# Patient Record
Sex: FEMALE | Race: OTHER | HISPANIC OR LATINO | Employment: PART TIME | URBAN - METROPOLITAN AREA
[De-identification: names, ages, dates, MRNs, and addresses within clinical notes are randomized per-mention and may not be internally consistent; named-entity substitution may affect disease eponyms.]

---

## 2018-11-12 ENCOUNTER — HISTORICAL (OUTPATIENT)
Dept: SURGERY | Facility: CLINIC | Age: 28
End: 2018-11-12

## 2018-11-13 ENCOUNTER — HISTORICAL (OUTPATIENT)
Dept: SURGERY | Facility: HOSPITAL | Age: 28
End: 2018-11-13

## 2020-10-20 ENCOUNTER — HISTORICAL (OUTPATIENT)
Dept: ADMINISTRATIVE | Facility: HOSPITAL | Age: 30
End: 2020-10-20

## 2020-10-23 ENCOUNTER — HISTORICAL (OUTPATIENT)
Dept: RADIOLOGY | Facility: HOSPITAL | Age: 30
End: 2020-10-23

## 2022-04-09 ENCOUNTER — HISTORICAL (OUTPATIENT)
Dept: ADMINISTRATIVE | Facility: HOSPITAL | Age: 32
End: 2022-04-09

## 2022-04-26 VITALS
BODY MASS INDEX: 27.28 KG/M2 | DIASTOLIC BLOOD PRESSURE: 78 MMHG | DIASTOLIC BLOOD PRESSURE: 83 MMHG | OXYGEN SATURATION: 99 % | BODY MASS INDEX: 26.45 KG/M2 | HEIGHT: 64 IN | WEIGHT: 149.25 LBS | SYSTOLIC BLOOD PRESSURE: 122 MMHG | HEIGHT: 63 IN | WEIGHT: 159.81 LBS | SYSTOLIC BLOOD PRESSURE: 112 MMHG

## 2022-04-30 NOTE — PROGRESS NOTES
Patient:   Jessie Cedeno             MRN: [id number]            FIN: [id number]               Age:   28 years     Sex:  Female     :  1990   Associated Diagnoses:   None   Author:   Amanda Zamora MD      Pre-Op Dx:  28 F with severe cervical dysplasia        Plan:  CKC    Reviewed resident's H&P.  Agree with plan.  Proceed with case

## 2022-05-02 NOTE — HISTORICAL [facility] CERNER
This is a historical note converted from [employer]. Formatting and pictures may have been removed.  Please reference [employer] for original formatting and attached multimedia. ?  Chief Complaint  H/O TORIE III  ?   History of Present Illness  27yo  referred to our care for a history of abnormal paps. Patient recently postpartum (3months). Had a pap at the start of pregnancy showing HGSIL which was her first ever abnormal pap. Patient had subsequent biopsies done at [facility] that resulted as TORIE 3 and ECC with HGSIL. Patient is otherwise healthy and denies any other medical history.?Patient denies smoking history and is doubtful that she ever received HPV vaccine.  ?   Cervical dysplasia history:  2017 Pap ASC-H  2018 Colpo with bx at 1, 5, 11:00- TORIE 3  2018 Colpo with bx at 2, 4,7, 10, 12:00- TORIE 3 with extension to the endocervical glands. ECC: HGSIL  ?   Patient taking Microgestin for contraception, pleased with regularity of menses.  ?   PMH: h/o PCOS  PSH: Denies  Ob: 2 FT , no complications throughout pregnancies or deliveries  GYN: ~LMP 2018. 14/R/4-5d. Denies h/o STDs. First abnormal pap in 2017. Sexually active with 1 male partner  SH: No T/E/D  FH: Denies  NKDA  Meds: Microgestin  ?  Review of Systems  Constitutional:?no fever, no weakness, no weight loss, no?weight gain  Eyes: no visual changes, no headaches,  Ears, Nose,Mouth, Throat: no runny nose, no sore throat, no cough  Respiratory:?no wheezing,?no shortness of breath  Cardiovascular:?no chest pain, no palpitations  Gastrointestinal:?no nausea, no vomiting,?no diarrhea, no?abdominal pain  Genitourinary: no hematuria, no dysuria,?no heavy vaginal bleeding, no pelvic pain and/or pressure, no?vaginal irritation, no?abnormal discharge  Musculoskeletal: no back pain, no joint pain, no extremity weakness  Endocrine: no polyuria, no polydipsia, no heat/cold intolerance  Hematologic/Lymphatic: no anemia, no  petechiae  Integumentary/Breast:?no skin rash or abnormal lesion  ?  Physical Exam  ???Vitals & Measurements  ??T:?36.8? ?C (Oral)? HR:?76(Apical)? RR:?18? BP:?129/83?  ??HT:?160.02?cm? HT:?160.02?cm? WT:?66.9?kg? WT:?66.9?kg? BMI:?26.13?  ?  General: NAD, A/Ox3  Neck: supple, no thyromegaly palpated, no lymphadenopathy palpated  Respiratory: CTAB, no wheezes, rales, or rhonchi  Cardiovascular: RRR no murmurs, rubs, or gallops  Abdomen: soft, obese, nondistended, nontender to palpation, no hepatosplenomegaly, no masses palpated, normoactive bowel sounds  Extremities: no edema, no calf tenderness  External genitalia: Normal female anatomy, no masses/lesions. Normal appearing urethral meatus. Normal appearing external anus.  Sterile speculum exam: vaginal mucosa normal in appearance. Pink. No masses/lesions. Cervix well visualized, smooth in contour no masses or lesions. Large parous cervix, white plaques noted circumferentially from 12:00-10:00 of transformation zone. Cervix not friable. Blood/Mucous discharge coming from the os.  Bimanual exam: Vagina with moderate capacity. Uterus 10cm in size, no cervical motion tenderness.?No descent, mobile, not broad. No adnexal fullness/tenderness. Normal urethra. Normal bladder.  ?  Assessment/Plan  29yo  with?severe cervical dysplasia here for preoperative assessment for Cold Knife Conization.  ?  --Per cervical examination with staff Dr Tripp, it was noted that her circumferential dysplasia wouldneed to be excised in the operating room with adequate visualization and hemostasis.  --Counseling performed. Surgical consents signed. Preop testing ordered. Surgery case requested. Instruction reviewed, including NPO after midnight. Patient given date and time for PAT appointment today.  --Surgical plan: to OR on 18 for Cold knife conization  --Post-op appointment: 18.  ?  ?  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 Highest education level: High school. Operates hazardous equipment: No., 11/07/2018  ??Exercise  ???Exercise duration: 0., 11/07/2018  ??Home/Environment  ???Lives with Children, Spouse. Alcohol abuse in household: No. Substance abuse in household: No. Smoker in household: No. Injuries/Abuse/Neglect in household: No. Feels unsafe at home: No. Safe place to go: Yes. Family/Friends available for support: Yes., 11/07/2018  ??Nutrition/Health  ???Regular, Wants to lose weight: No. Sleeping concerns: Yes. Feels highly stressed: No., 11/07/2018  ??Sexual  ???Sexually active: Yes. Sexually active at age 18 Years. Number of current partners 1. Number of lifetime partners 1. Sexual orientation: Heterosexual. Uses condoms: No. History of sexual abuse: No., 11/07/2018  ??Substance Abuse  ???Never, 11/07/2018  ??Tobacco  ???Never smoker, N/A, 11/07/2018  Health Maintenance  Health Maintenance  ???Pending?(in the next year)  ??? ??Due?  ??? ? ? ?ADL Screening due??11/08/18??and every 1??year(s)  ??? ? ? ?Alcohol Misuse Screening due??11/08/18??and every 1??year(s)  ??? ? ? ?Tetanus Vaccine due??11/08/18??and every 10??year(s)  ??? ??Due In Future?  ??? ? ? ?Obesity Screening not due until??11/07/19??and every 1??year(s)  ???Satisfied?(in the past 1 year)  ??? ??Satisfied?  ??? ? ? ?Blood Pressure Screening on??11/07/18.??Satisfied by Alka LPN, Mandy P. P.  ??? ? ? ?Body Mass Index Check on??11/07/18.??Satisfied by Mandy Rucker LPN P. P.  ??? ? ? ?Depression Screening on??11/07/18.??Satisfied by Alka CANTU, Mandy P. P.  ??? ? ? ?Obesity Screening on??11/07/18.??Satisfied by Alka CANTU, Mandy P. P.    Counseling:  ?   We  discussed the risks of surgery include but are not limited to visceral or vascular injury, including injury to bowel and bladder, postoperative pain, bleeding including hemorrhage requiring transfusion, and infection. She agrees to blood transfusion in case of health or life threatening blood loss. Patient expressed understanding and is amenable to proceed with surgery.  ?   Patient also seen and counseled by Dr Morrison and Dr Zamora  ?   Reviewed patients medical history, Residents findings on physical exam, and the diagnosis with treatment plan. I was present for the entire colposcopy procedure. I agree with care provided and was reasonable and necessary

## 2022-05-02 NOTE — HISTORICAL [facility] CERNER
This is a historical note converted from Moi. Formatting and pictures may have been removed.  Please reference Moi for original formatting and attached multimedia. [facility] GYN Interval Update H&P  Patient seen and evaluated by myself and Dr Zamora preoperatively on the AM of surgery. There have been no changes since being seen in the office by myself on yesterday 2018. Patient able to state in her own words the procedure that she will be having done on today. She is here with her  Edgar who we will update following completion of the procedure. [phone].  ?  ?  ?   History of Present Illness  29yo  referred to our care for a history of abnormal paps. Patient recently postpartum (3months). Had a pap at the start of pregnancy showing HGSIL which was her first ever abnormal pap. Patient had subsequent biopsies done at [facility] that resulted as TORIE 3 and ECC with HGSIL. Patient is otherwise healthy and denies any other medical history.?Patient denies smoking history and is doubtful that she ever received HPV vaccine.  ?   Cervical dysplasia history:  2017 Pap ASC-H  2018 Colpo with bx at 1, 5, 11:00- TORIE 3  2018 Colpo with bx at 2, 4,7, 10, 12:00- TORIE 3 with extension to the endocervical glands. ECC: HGSIL  ?  Patient taking Microgestin for contraception, pleased with regularity of menses.  ?  PMH: h/o PCOS  PSH: Denies  Ob: 2 FT , no complications throughout pregnancies or deliveries  GYN: ~LMP 2018. 14/R/4-5d. Denies h/o STDs. First abnormal pap in . Sexually active with 1 male partner  SH: No T/E/D  FH: Denies  NKDA  Meds: Microgestin  ?  Review of Systems  Constitutional:?no fever, no weakness, no weight loss, no?weight gain  Eyes: no visual changes, no headaches,  Ears, Nose,Mouth, Throat: no runny nose, no sore throat, no cough  Respiratory:?no wheezing,?no shortness of breath  Cardiovascular:?no chest pain, no palpitations  Gastrointestinal:?no  nausea, no vomiting,?no diarrhea, no?abdominal pain  Genitourinary: no hematuria, no dysuria,?no heavy vaginal bleeding, no pelvic pain and/or pressure, no?vaginal irritation, no?abnormal discharge  Musculoskeletal: no back pain, no joint pain, no extremity weakness  Endocrine: no polyuria, no polydipsia, no heat/cold intolerance  Hematologic/Lymphatic: no anemia, no petechiae  Integumentary/Breast:?no skin rash or abnormal lesion  ?  Physical Exam  ????Vitals & Measurements  ???T:?36.9? ?C (Oral)? HR:?85 RR:?20? BP:?125/75?  ?  General: NAD, A/Ox3  Neck: supple, no thyromegaly palpated, no lymphadenopathy palpated  Respiratory: CTAB, no wheezes, rales, or rhonchi  Cardiovascular: RRR no murmurs, rubs, or gallops  Abdomen: soft, obese, nondistended, nontender to palpation, no hepatosplenomegaly, no masses palpated, normoactive bowel sounds  Extremities: no edema, no calf tenderness  ?  Pelvic Exam done by Dr Guillermo on 2018  External genitalia: Normal female anatomy, no masses/lesions. Normal appearing urethral meatus. Normal appearing external anus.  Sterile speculum exam: vaginal mucosa normal in appearance. Pink. No masses/lesions. Cervix well visualized, smooth in contour no masses or lesions. Large parous cervix, white plaques noted circumferentially from 12:00-10:00 of transformation zone. Cervix not friable. Blood/Mucous discharge coming from the os.  Bimanual exam: Vagina with moderate capacity. Uterus 10cm in size, no cervical motion tenderness.?No descent, mobile, not broad. No adnexal fullness/tenderness. Normal urethra. Normal bladder.  ?  Assessment/Plan  27yo  with?severe cervical dysplasia here for Cold Knife Conization.  ?  --Counseling performed again and patient expressed understanding.  --Surgical plan: To OR for Cold knife conization  --Post-op appointment: 18.  ?  ?  Patient seen and evaluate by Dr Zamora prior to proceeding to the OR. 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 Highest education level: High school. Operates hazardous equipment: No., 11/07/2018  ???Exercise  ????Exercise duration: 0., 11/07/2018  ???Home/Environment  ????Lives with Children, Spouse. Alcohol abuse in household: No. Substance abuse in household: No. Smoker in household: No. Injuries/Abuse/Neglect in household: No. Feels unsafe at home: No. Safe place to go: Yes. Family/Friends available for support: Yes., 11/07/2018  ???Nutrition/Health  ????Regular, Wants to lose weight: No. Sleeping concerns: Yes. Feels highly stressed: No., 11/07/2018  ???Sexual  ????Sexually active: Yes. Sexually active at age 18 Years. Number of current partners 1. Number of lifetime partners 1. Sexual orientation: Heterosexual. Uses condoms: No. History of sexual abuse: No., 11/07/2018  ???Substance Abuse  ????Never, 11/07/2018  ???Tobacco  ????Never smoker, N/A, 11/07/2018  Health Maintenance  Health Maintenance  ???Pending?(in the next year)  ??? ??Due?  ??? ? ? ?ADL Screening due??11/08/18??and every 1??year(s)  ??? ? ? ?Alcohol Misuse Screening due??11/08/18??and every 1??year(s)  ??? ? ? ?Tetanus Vaccine due??11/08/18??and every 10??year(s)  ??? ??Due In Future?  ??? ? ? ?Obesity Screening not due until??11/07/19??and every 1??year(s)  ???Satisfied?(in the past 1 year)  ??? ??Satisfied?  ??? ? ? ?Blood Pressure Screening on??11/07/18.??Satisfied by Alka CANTU, Mandy P. P.  ??? ? ? ?Body Mass Index Check on??11/07/18.??Satisfied by Alka CANTU, Mandy P. P.  ??? ? ? ?Depression Screening on??11/07/18.??Satisfied by Alka CANTU, Mandy P. P.  ??? ? ? ?Obesity Screening on??11/07/18.??Satisfied by  Alka CANTU, Mandy DELACRUZ   ?  ?

## 2022-05-02 NOTE — HISTORICAL [facility] CERNER
This is a historical note converted from [employer]ner. Formatting and pictures may have been removed.  Please reference [employer]ner for original formatting and attached multimedia. Indication for Surgery  27yo  with?severe cervical dysplasia  Preoperative Diagnosis  Severe Cervical Dysplasia  Postoperative Diagnosis  Severe Cervical Dysplasia  Operation  Cold Knife Conization  Surgeon(s)  EMILIE Zamora MD- MD Caroline Gutierrez MD- JANES  Anesthesia  General  Estimated Blood Loss  50cc  Urine Output  100 cc  Findings  EUA revealed a normal 8 week sized uterus with normal contour, mobile not broad. Adequate vaginal capacity in the event a vaginal hysterectomy may be needed. Cervix with severe dysplasia noted from 12 oclock to 2 oclock, 5 oclock, and from 7 oclock to 10 oclock after application of acetic acid.  Specimen(s)  Anterior and Posterior Cervix  Complications  None  Technique  The patient was taken to the operating room and placed in the supine position on the operating table, where general anesthesia was administered. A time out was then performed confirming the procedure and the patient. She was then placed in the dorsal lithotomy position where examination under anesthesia was performed. The patient was prepped and draped in the usual manner for surgery. A weighted speculum was inserted into the vagina. The cervix was exposed with a Woodruff retractor, and the anterior lip of the cervix was grasped with a single-toothed tenaculum.? Acetic acid was applied to the cervix and transformation zone visualized with the above lesions noted. Angle sutures of 0 Vicryl were placed at the 3 o?clock and 9 o?clock positions of the cervix in a figure-of-eight fashion. The cervix was infiltrated with diluted Pitressin solution?20 units?in 50?mL of sterile saline. Approximately 10 mL of the solution was utilized. Cone biopsy was then excised with an 11 scalpel blade. After excision of the biopsy specimen, the biopsy  bed was treated with cautery to assure?hemostasis. An endometrial curettage was undertaken with a medium size sharp curettage. Following curettage, the cervix was again inspected for hemostasis and treated again with cautery. Surgicel was placed into?cervical bed for?additional hemostasis and stay sutures were tied together.?The procedure was then completed. All instruments were removed. The patient was returned to the supine position. The patient was awakened from anesthesia without any difficulty and transferred to the recovery room in good condition. The patient tolerated the procedure well.  ?  ?   Dr Zamora was present for the entire procedure.  ?  ?   I was present with the resident during all critical and key portions of the procedure and agree with the findings documented in the residents note.   An endocervical curettage was performed and not endometrial; therefore the additional specimen submitted to pathology was endocervical.

## 2022-11-16 ENCOUNTER — OFFICE VISIT (OUTPATIENT)
Dept: GYNECOLOGY | Facility: CLINIC | Age: 32
End: 2022-11-16

## 2022-11-16 VITALS
WEIGHT: 168.81 LBS | HEIGHT: 63 IN | BODY MASS INDEX: 29.91 KG/M2 | OXYGEN SATURATION: 99 % | DIASTOLIC BLOOD PRESSURE: 81 MMHG | TEMPERATURE: 98 F | SYSTOLIC BLOOD PRESSURE: 125 MMHG | HEART RATE: 76 BPM | RESPIRATION RATE: 18 BRPM

## 2022-11-16 DIAGNOSIS — N87.9 CERVICAL DYSPLASIA: Primary | ICD-10-CM

## 2022-11-16 PROCEDURE — 87624 HPV HI-RISK TYP POOLED RSLT: CPT

## 2022-11-16 PROCEDURE — 99214 OFFICE O/P EST MOD 30 MIN: CPT | Mod: PBBFAC

## 2022-11-16 RX ORDER — DESOGESTREL AND ETHINYL ESTRADIOL 0.15-0.03
1 KIT ORAL DAILY
Qty: 30 TABLET | Refills: 11 | Status: SHIPPED | OUTPATIENT
Start: 2022-11-16 | End: 2023-11-21 | Stop reason: SDUPTHER

## 2022-11-16 RX ORDER — DESOGESTREL AND ETHINYL ESTRADIOL 0.15-0.03
1 KIT ORAL DAILY
COMMUNITY
Start: 2022-11-14 | End: 2022-11-16 | Stop reason: SDUPTHER

## 2022-11-16 NOTE — PROGRESS NOTES
"  [facility] GYNECOLOGY CLINIC NOTE     Jessie Cedeno is a 32 y.o.  with history of CKC for CIN3 presenting to GYN clinic for repeat pap. Pt's last pap was 2019 which was NILM but insufficient ECC and was lost to follow up given COVID. Pt reports no gyn issues. Having regular periods controlled with OCP's.    Cervical dysplasia history:  2017 Pap ASC-H  2018 Colpo with bx at 1, 5, 11:00- TORIE 3  2018 Colpo with bx at 2, 4,7, 10, 12:00- TORIE 3 with extension to the endocervical glands. ECC: HGSIL  2018, CKC: Benign cone; ECC cannot rule out high-grade lesion  2019, Pap/ECC: NILM, ECC: Scant strips of endocervical and squamous epithelium. Insufficient tissue for further diagnostic evaluation    PMH: h/o PCOS  PSH: CKC ()  OB Hx:    2 FT , no complications throughout pregnancies or deliveries  Gyn Hx:  LMP 2022   14/R/4-5d  Contraception: Enskyce  Sexually active with 1 male partner  Denies h/o STD's  Meds: Enskyce  SHx: No T/E/D  FHx: Denies  Allergies: NKDA    Review of Systems  Pertinent items are noted in HPI.     Objective:     /81 (BP Location: Right arm, Patient Position: Sitting, BP Method: Medium (Automatic))   Pulse 76   Temp 97.8 °F (36.6 °C) (Oral)   Resp 18   Ht 5' 3" (1.6 m)   Wt 76.6 kg (168 lb 12.8 oz)   LMP 2022 (Exact Date)   SpO2 99%   BMI 29.90 kg/m²   Physical Exam:  Gen: Well-nourished, well-developed female appearing stated age. Alert, cooperative, in no acute distress.  CV: rrr, no murmurs/rubs/gallops  Chest: ctabl, no wheezes/rales/rhonchi  Abdomen: Soft, non-tender, no masses.  Extrem: Extremities normal, atraumatic, non-tender calves.  External genitalia: Normal female genetalia without lesion, discharge or tenderness. I  Speculum Exam: Vaginal vault without discharge, nonodorous, no lesions/masses seen.  Cervical os visualized as parous, no lesions/masses.   Bimanual Exam: No cervical motion tenderness.  Uterus freely mobile.  " Normal size uterus. No adnexal masses.  Nontender exam.   Note: RN chaperone present for entirety of exam.      Assessment:       32 y.o.  here for repeat cotesting given h/o CIN3 s/p CKC with ECC cannot rule out HSIL.  1. Cervical dysplasia  Liquid-Based Pap Smear, Screening Screening    Ambulatory referral/consult to Family Practice           Plan:     Problem List Items Addressed This Visit          Renal/    Cervical dysplasia - Primary    Relevant Orders    Liquid-Based Pap Smear, Screening Screening    Ambulatory referral/consult to Family Practice       Return to clinic in 1 year for repeat co-testing or sooner prn    Discussed patient and plan with Dr. Zamora.    Dakotah Coombs MD  [employer] [facility]-PGY2  2022 3:12 PM

## 2022-11-22 LAB
INSULIN SERPL-ACNC: NORMAL U[IU]/ML
LAB AP BETHESDA CATEGORY: NORMAL
LAB AP GYN MOLECULAR TESTING: NORMAL
LAB AP LMP DATE: NORMAL
LAB AP OCHS PAP SPECIMEN ADEQUACY: NORMAL
LAB AP OHS PAP INTERPRETATION: NORMAL
LAB AP PAP DISCLAIMER COMMENTS: NORMAL

## 2022-11-25 ENCOUNTER — TELEPHONE (OUTPATIENT)
Dept: GYNECOLOGY | Facility: CLINIC | Age: 32
End: 2022-11-25

## 2022-11-25 NOTE — TELEPHONE ENCOUNTER
----- Message from Dakotah Coombs MD sent at 11/23/2022  7:59 PM CST -----  Please call pt with normal pap results. She will need repeat pap in 1 year  Called patient to give her results of normal pap.  Has an annual scheduled for next year already

## 2022-12-13 LAB — HPV16+18+H RISK 12 DNA CVX-IMP: NEGATIVE

## 2023-11-21 ENCOUNTER — OFFICE VISIT (OUTPATIENT)
Dept: GYNECOLOGY | Facility: CLINIC | Age: 33
End: 2023-11-21

## 2023-11-21 VITALS
RESPIRATION RATE: 20 BRPM | BODY MASS INDEX: 31.96 KG/M2 | TEMPERATURE: 98 F | WEIGHT: 180.38 LBS | SYSTOLIC BLOOD PRESSURE: 125 MMHG | OXYGEN SATURATION: 100 % | HEART RATE: 90 BPM | HEIGHT: 63 IN | DIASTOLIC BLOOD PRESSURE: 88 MMHG

## 2023-11-21 DIAGNOSIS — Z30.9 ENCOUNTER FOR CONTRACEPTIVE MANAGEMENT, UNSPECIFIED TYPE: ICD-10-CM

## 2023-11-21 DIAGNOSIS — Z12.4 PAP SMEAR FOR CERVICAL CANCER SCREENING: Primary | ICD-10-CM

## 2023-11-21 PROCEDURE — 88174 CYTOPATH C/V AUTO IN FLUID: CPT | Performed by: NURSE PRACTITIONER

## 2023-11-21 PROCEDURE — 87624 HPV HI-RISK TYP POOLED RSLT: CPT

## 2023-11-21 PROCEDURE — 99395 PR PREVENTIVE VISIT,EST,18-39: ICD-10-PCS | Mod: S$PBB,,, | Performed by: NURSE PRACTITIONER

## 2023-11-21 PROCEDURE — 99395 PREV VISIT EST AGE 18-39: CPT | Mod: S$PBB,,, | Performed by: NURSE PRACTITIONER

## 2023-11-21 RX ORDER — DESOGESTREL AND ETHINYL ESTRADIOL 0.15-0.03
1 KIT ORAL DAILY
Qty: 30 TABLET | Refills: 12 | Status: SHIPPED | OUTPATIENT
Start: 2023-11-21

## 2023-11-21 NOTE — PROGRESS NOTES
"  Subjective:       Patient ID: Jessie Cedeno is a 33 y.o. female.    Chief Complaint:  Gynecologic Exam      History of Present Illness  The patient  here for annual exam. Her LMP was 10/30/23. Period last 4 days and changes pads 5x/day. History of abnormal pap and procedures (see below). Last pap -NIL and HPV neg. Denies breast or urinary complaints. Denies pelvic pain or discharge. Pt reports no STIs in the past and no concerns. HPV vaccinated. Contraception consist of OCPs. Denies tobacco use. Dep. screening 0. Denies fly hx of breast, ovarian, uterine or colon cancer.     Cervical dysplasia history:  2017 Pap ASC-H  2018 Colpo with bx at 1, 5, 11:00- TORIE 3  2018 Colpo with bx at 2, 4,7, 10, 12:00- TORIE 3 with extension to the endocervical glands. ECC: HGSIL  2018 CKC: Benign cone; ECC cannot rule out high-grade lesion   2019  Pap/ECC: NILM, ECC: Scant strips of endocervical and squamous epithelium. Insufficient tissue for further diagnostic evaluation   2022 NIL and HPV neg    GYN & OB History  Patient's last menstrual period was 10/30/2023.   Date of Last Pap: 2022    Review of patient's allergies indicates:  No Known Allergies  Past Medical History:   Diagnosis Date    Abnormal Pap smear of cervix     Hyperlipidemia      OB History    Para Term  AB Living   2 2           SAB IAB Ectopic Multiple Live Births                  # Outcome Date GA Lbr Juan Diego/2nd Weight Sex Delivery Anes PTL Lv   2 Para            1 Para                 Review of Systems  Review of Systems    Negative except for pertinent findings for positives per HPI     Objective:    Physical Exam    /88 (BP Location: Left arm, Patient Position: Sitting, BP Method: Medium (Automatic))   Pulse 90   Temp 98.4 °F (36.9 °C) (Oral)   Resp 20   Ht 5' 3" (1.6 m)   Wt 81.8 kg (180 lb 6.4 oz)   LMP 10/30/2023   SpO2 100%   BMI 31.96 kg/m²   GENERAL: Well-developed female. No acute distress.  "   SKIN: Normal to inspection, warm and intact.  BREASTS: No rashes or erythema. No masses, lumps, discharge, tenderness.  ABDOMEN: Soft, non tender.  VULVA: General appearance normal; external genitalia with no lesions or erythema.  BLADDER: No tenderness.  VAGINA: Mucosa/vaginal vault pink, no abnormal discharge or lesions.  CERVIX: Pink, parous appearing os, no erythema or abnormal discharge.  BIMANUAL EXAM: reveals a 12 week-sized uterus. The uterus is regular, mobile, and non tender. Geremias adnexa reveal no tenderness.  PSYCHIATRIC: Patient is oriented to person, place, and time. Mood and affect are normal.    Assessment:       1. Pap smear for cervical cancer screening    2. Encounter for contraceptive management, unspecified type       Plan:   Jessie was seen today for gynecologic exam.    Diagnoses and all orders for this visit:    Pap smear for cervical cancer screening  -     Liquid-Based Pap Smear, Screening Screening    Encounter for contraceptive management, unspecified type  -     ENSKYCE 0.15-0.03 mg per tablet; Take 1 tablet by mouth once daily.    Pap today  Continue OCPs  HPV vaccine information provided  Pt to f/u with PCP for headaches  Follow up in about 1 year (around 11/21/2024) for annual exam.

## 2023-11-24 LAB — PSYCHE PATHOLOGY RESULT: NORMAL

## 2024-11-22 ENCOUNTER — OFFICE VISIT (OUTPATIENT)
Dept: GYNECOLOGY | Facility: CLINIC | Age: 34
End: 2024-11-22

## 2024-11-22 VITALS
RESPIRATION RATE: 20 BRPM | HEIGHT: 63 IN | OXYGEN SATURATION: 100 % | BODY MASS INDEX: 33.02 KG/M2 | WEIGHT: 186.38 LBS | TEMPERATURE: 99 F

## 2024-11-22 DIAGNOSIS — Z12.4 PAP SMEAR FOR CERVICAL CANCER SCREENING: Primary | ICD-10-CM

## 2024-11-22 DIAGNOSIS — Z30.41 ENCOUNTER FOR SURVEILLANCE OF CONTRACEPTIVE PILLS: ICD-10-CM

## 2024-11-22 DIAGNOSIS — Z23 NEED FOR HPV VACCINE: ICD-10-CM

## 2024-11-22 PROCEDURE — 99213 OFFICE O/P EST LOW 20 MIN: CPT | Mod: PBBFAC | Performed by: NURSE PRACTITIONER

## 2024-11-22 PROCEDURE — 99395 PREV VISIT EST AGE 18-39: CPT | Mod: S$PBB,,, | Performed by: NURSE PRACTITIONER

## 2024-11-22 RX ORDER — DESOGESTREL AND ETHINYL ESTRADIOL 0.15-0.03
1 KIT ORAL DAILY
Qty: 30 TABLET | Refills: 12 | Status: SHIPPED | OUTPATIENT
Start: 2024-11-22

## 2024-11-22 NOTE — PROGRESS NOTES
"  [facility] -  Gynecology / Women's Health Clinic      Subjective:       Patient ID: Jessie Cedeno is a 34 y.o. female.    Chief Complaint:  Gynecologic Exam    History of Present Illness  The patient  here for annual exam. Her LMP was 10/14/24. Period last 4 days and changes pads 3x/day. History of abnormal pap and procedures (see below). Last pap -NIL and HPV neg. Denies breast or urinary complaints. Denies pelvic pain or discharge. Pt reports no STIs in the past and no concerns. Desires HPV vaccine. Contraception consist of OCPs. Denies tobacco use. Dep. screening 0. Denies fly hx of breast, ovarian, uterine or colon cancer.      Cervical Dysplasia history:  2017 Pap [facility]-H  2018 Colpo with bx at 1, 5, 11:00- TORIE 3  2018 Colpo with bx at 2, 4,7, 10, 12:00- TORIE 3 with extension to the endocervical glands. ECC: HGSIL  2018 [facility]C: Benign cone; ECC cannot rule out high-grade lesion   2019  Pap/ECC: NILM, ECC: Scant strips of endocervical and squamous epithelium. Insufficient tissue for further diagnostic evaluation   2022 NIL and HPV neg   NIL and HPV neg    GYN & OB History  Patient's last menstrual period was 10/14/2024 (exact date).   Date of Last Pap: 2023    Review of patient's allergies indicates:  No Known Allergies  Past Medical History:   Diagnosis Date    Abnormal Pap smear of cervix     Hyperlipidemia      OB History    Para Term  AB Living   2 2           SAB IAB Ectopic Multiple Live Births                  # Outcome Date GA Lbr Juan Diego/2nd Weight Sex Type Anes PTL Lv   2 Para            1 Para                 Review of Systems  Review of Systems    Negative except for pertinent findings for positives per HPI     Objective:    Physical Exam    Temp 98.6 °F (37 °C) (Oral)   Resp 20   Ht 5' 3" (1.6 m)   Wt 84.6 kg (186 lb 6.4 oz)   LMP 10/14/2024 (Exact Date)   SpO2 100%   BMI 33.02 kg/m²   GENERAL: Well-developed female. No " acute distress.    SKIN: Normal to inspection, warm and intact.  BREASTS: No rashes or erythema. No masses, lumps, discharge, tenderness.  VULVA: General appearance normal; external genitalia with no lesions or erythema.  VAGINA: Mucosa/vaginal vault pink, no abnormal discharge or lesions.  CERVIX: Pink, parous appearing os, scant blood in vaginal vault, no erythema or abnormal discharge.  BIMANUAL EXAM: reveals a 12 week-sized uterus. The uterus is non tender. Geremias adnexa reveal no tenderness.  PSYCHIATRIC: Patient is oriented to person, place, and time. Mood and affect are normal.    Assessment:         ICD-10-CM ICD-9-CM   1. Pap smear for cervical cancer screening  Z12.4 V76.2   2. Encounter for surveillance of contraceptive pills  Z30.41 V25.41   3. Need for HPV vaccine  Z23 V04.89     Plan:   Jessie was seen today for gynecologic exam.    Diagnoses and all orders for this visit:    Pap smear for cervical cancer screening  -     Liquid-Based Pap Smear, Screening    Encounter for surveillance of contraceptive pills  -     ENSKYCE 0.15-0.03 mg per tablet; Take 1 tablet by mouth once daily.    Need for HPV vaccine  -     hpv vaccine,9-yvette (GARDASIL 9) vaccine 0.5 mL    Pap today  Continue OCPs for contraception  Benefits and risk associated with HPV and the Gardasil vaccine discussed with pt. HPV vaccine today, #2 in 2 months and #3 in 6 months after 1st injection. Side effects such as pain, swelling, redness/itching, fever, nausea and dizziness can occur. Recommend waiting 15 min in waiting room following injection.  Follow up in about 1 year (around 11/22/2025) for annual exam.

## 2024-11-22 NOTE — PROGRESS NOTES
Gave patient Gardasil #1 in her left arm. Patient tolerated well and left with no concerns. Patient advised if anything changes to contact the clinic. Patient verbalized understanding.

## 2024-12-23 ENCOUNTER — TELEPHONE (OUTPATIENT)
Dept: RHEUMATOLOGY | Facility: CLINIC | Age: 34
End: 2024-12-23

## 2024-12-23 DIAGNOSIS — R79.82 ELEVATED C-REACTIVE PROTEIN (CRP): Primary | ICD-10-CM

## 2024-12-23 DIAGNOSIS — R70.0 ELEVATION, SEDIMENTATION RATE: ICD-10-CM

## 2025-05-22 ENCOUNTER — CLINICAL SUPPORT (OUTPATIENT)
Dept: GYNECOLOGY | Facility: CLINIC | Age: 35
End: 2025-05-22

## 2025-05-22 DIAGNOSIS — Z23 NEED FOR HPV VACCINE: Primary | ICD-10-CM

## 2025-05-22 PROCEDURE — 90471 IMMUNIZATION ADMIN: CPT | Mod: PBBFAC

## 2025-05-22 PROCEDURE — 99212 OFFICE O/P EST SF 10 MIN: CPT | Mod: PBBFAC

## 2025-05-22 PROCEDURE — 90651 9VHPV VACCINE 2/3 DOSE IM: CPT | Mod: PBBFAC

## 2025-05-22 RX ADMIN — HUMAN PAPILLOMAVIRUS 9-VALENT VACCINE, RECOMBINANT 0.5 ML: 30; 40; 60; 40; 20; 20; 20; 20; 20 INJECTION, SUSPENSION INTRAMUSCULAR at 10:05

## 2025-05-22 NOTE — PROGRESS NOTES
Administered #2 Gardasil-9 (0.5 ml) IM right deltoid.  Monitored her in clinic after injection for 20 minutes then discharged with NO s/s of any adverse reactions.

## 2025-07-25 ENCOUNTER — TELEPHONE (OUTPATIENT)
Dept: GYNECOLOGY | Facility: CLINIC | Age: 35
End: 2025-07-25

## 2025-07-25 NOTE — TELEPHONE ENCOUNTER
Inform pt unsure of why she skipped a cycle last month. It is common to have a heavy cycle following the missed cycle the month prior. Would recommend pregnancy test at home and then monitor next cycle. She can take OTC Ibuprofen for period cramps. If cramping continues with cycles, we can order a pelvic uls for further evaluation. Also provide ER precautions if cramping worsens or if she saturates pads in 1 hour.

## 2025-07-25 NOTE — TELEPHONE ENCOUNTER
Called patient to gather more information on current problems. Unable to reach patient and voicemail is not set up. Will call patient at a later time.

## 2025-07-25 NOTE — TELEPHONE ENCOUNTER
Spoke to patient and she will monitor of the weekend. If pain continues UCC and ER precautions given. Directives given per NP. Patient verbalized understanding.